# Patient Record
Sex: FEMALE | Race: WHITE | Employment: OTHER | URBAN - METROPOLITAN AREA
[De-identification: names, ages, dates, MRNs, and addresses within clinical notes are randomized per-mention and may not be internally consistent; named-entity substitution may affect disease eponyms.]

---

## 2017-04-17 ENCOUNTER — OFFICE VISIT (OUTPATIENT)
Dept: FAMILY MEDICINE CLINIC | Facility: CLINIC | Age: 82
End: 2017-04-17

## 2017-04-17 VITALS
HEART RATE: 72 BPM | WEIGHT: 137 LBS | RESPIRATION RATE: 24 BRPM | OXYGEN SATURATION: 95 % | DIASTOLIC BLOOD PRESSURE: 70 MMHG | SYSTOLIC BLOOD PRESSURE: 148 MMHG | TEMPERATURE: 98 F

## 2017-04-17 DIAGNOSIS — J18.9 PNEUMONITIS: Primary | ICD-10-CM

## 2017-04-17 PROCEDURE — 99213 OFFICE O/P EST LOW 20 MIN: CPT

## 2017-04-17 RX ORDER — CIPROFLOXACIN 500 MG/1
500 TABLET, FILM COATED ORAL 2 TIMES DAILY
Qty: 20 TABLET | Refills: 0 | Status: SHIPPED | OUTPATIENT
Start: 2017-04-17 | End: 2017-04-18

## 2017-04-17 RX ORDER — LEVOTHYROXINE SODIUM 0.05 MG/1
TABLET ORAL
COMMUNITY
Start: 2017-03-04

## 2017-04-17 RX ORDER — TRIAMCINOLONE ACETONIDE 0.1 %
PASTE (GRAM) DENTAL
Refills: 2 | COMMUNITY
Start: 2017-01-31

## 2017-04-17 RX ORDER — BENZONATATE 100 MG/1
CAPSULE ORAL
Refills: 0 | COMMUNITY
Start: 2017-04-14

## 2017-04-17 RX ORDER — TRAMADOL HYDROCHLORIDE 50 MG/1
TABLET ORAL
Refills: 0 | COMMUNITY
Start: 2017-02-15

## 2017-04-17 NOTE — PROGRESS NOTES
CHIEF COMPLAINT:   Patient presents with:  Cough/URI: since 4/13/17    HPI:   Donna Rivera is a 80year old female who presents with upper respiratory symptoms for  4  days.  Patient reports cough with yellow colored sputum, cough is keeping pt up at nig CARDIO: RSR without murmur  LYMPH: No lymphadenopathy   EXTREMITIES: no cyanosis or edema         ASSESSMENT AND PLAN:   Ca Omer is a 80year old female who presents with upper respiratory symptoms that are consistent with    ASSESSMENT:   Pneumoni · You may use acetaminophen or ibuprofen to control fever or pain, unless another medicine was prescribed. If you have chronic liver or kidney disease, talk with your health care provider before using these medicines.  Also talk with your provider if [facility] © 1515-4879 The [address] [place] [address], [address] [place]. All rights reserved. This information is not intended as a substitute for professional medical care. Always follow your healthcare professional's instructions.     Encouraged

## 2017-04-18 ENCOUNTER — APPOINTMENT (OUTPATIENT)
Dept: GENERAL RADIOLOGY | Age: 82
End: 2017-04-18
Attending: FAMILY MEDICINE
Payer: MEDICARE

## 2017-04-18 ENCOUNTER — TELEPHONE (OUTPATIENT)
Dept: FAMILY MEDICINE CLINIC | Facility: CLINIC | Age: 82
End: 2017-04-18

## 2017-04-18 ENCOUNTER — HOSPITAL ENCOUNTER (OUTPATIENT)
Age: 82
Discharge: HOME | End: 2017-04-18
Attending: FAMILY MEDICINE
Payer: MEDICARE

## 2017-04-18 VITALS
DIASTOLIC BLOOD PRESSURE: 58 MMHG | TEMPERATURE: 99 F | HEART RATE: 76 BPM | OXYGEN SATURATION: 97 % | RESPIRATION RATE: 22 BRPM | SYSTOLIC BLOOD PRESSURE: 125 MMHG

## 2017-04-18 DIAGNOSIS — J06.9 UPPER RESPIRATORY TRACT INFECTION, UNSPECIFIED TYPE: Primary | ICD-10-CM

## 2017-04-18 PROCEDURE — 99204 OFFICE O/P NEW MOD 45 MIN: CPT

## 2017-04-18 PROCEDURE — 99213 OFFICE O/P EST LOW 20 MIN: CPT

## 2017-04-18 PROCEDURE — 71020 XR CHEST PA + LAT CHEST (CPT=71020): CPT

## 2017-04-18 RX ORDER — AZITHROMYCIN 250 MG/1
TABLET, FILM COATED ORAL
Qty: 1 PACKAGE | Refills: 0 | Status: SHIPPED | OUTPATIENT
Start: 2017-04-18 | End: 2018-09-22

## 2017-04-18 NOTE — PATIENT INSTRUCTIONS
Pneumonia (Adult)  Pneumonia is an infection deep within the lungs. It is in the small air sacs (alveoli). Pneumonia may be caused by a virus or bacteria. Pneumonia caused by bacteria is usually treated with an antibiotic.  Severe cases may need to be andrey If you are 72 or older, you should get a pneumococcal vaccine and a yearly flu (influenza) shot. You should also get these vaccines if you have chronic lung disease like asthma, emphysema, or COPD. Ask your provider about this.   When to seek medical advice

## 2017-04-18 NOTE — ED INITIAL ASSESSMENT (HPI)
Patient came in stating that she was at our walk-in clinic yesterday and was told she had a mild pneumonia. She was started on an antibiotic and was told to get a followup x-ray. She presents today for an x-ray. She is going out of town tomorrow.

## 2017-04-18 NOTE — ED PROVIDER NOTES
Patient Seen in: [address]    History   Patient presents with:  Cough    Stated Complaint: diagnosed with mild pneumonia would like xray     HPI  26-year-old female with a past medical history significant for hypothyroi stated in HPI.     Physical Exam       ED Triage Vitals   BP 04/18/17 1715 150/56 mmHg   Pulse 04/18/17 1715 76   Resp 04/18/17 1715 22   Temp 04/18/17 1715 98.8 °F (37.1 °C)   Temp src --    SpO2 04/18/17 1715 95 %   O2 Device 04/18/17 1715 None (Room air) stay home with close follow-up but the decision is hers to make. She does report she has a follow-up appointment already scheduled with her primary care doctor on Monday. She will be gone from Wednesday to Sunday.   She was counseled that if she develops edema.  CHELSI/MEDIAST:   No visible mass or adenopathy. LUNGS: No focal airspace consolidation. PLEURA: No effusion. No pneumothorax.   BONES: Scoliosis and multilevel degenerative changes of the spine.     Dictated by (CST): Lesly Nino MD on 4/18/2

## 2017-04-18 NOTE — TELEPHONE ENCOUNTER
Follow up call, pt feels tired but overall, not too bad-  \"about the same as yesterday\". Denies fever, shortness of breath, chest discomfort.    Encourage pt to drink plenty of fluids,  Do deep breathing and not to suppress the cough, but rather, cough se

## 2017-06-24 ENCOUNTER — HOSPITAL ENCOUNTER (OUTPATIENT)
Age: 82
Discharge: HOME | End: 2017-06-24
Attending: FAMILY MEDICINE
Payer: MEDICARE

## 2017-06-24 VITALS
HEART RATE: 64 BPM | SYSTOLIC BLOOD PRESSURE: 167 MMHG | BODY MASS INDEX: 22.88 KG/M2 | RESPIRATION RATE: 18 BRPM | HEIGHT: 64 IN | OXYGEN SATURATION: 95 % | TEMPERATURE: 97 F | WEIGHT: 134 LBS | DIASTOLIC BLOOD PRESSURE: 66 MMHG

## 2017-06-24 DIAGNOSIS — H61.23 BILATERAL IMPACTED CERUMEN: Primary | ICD-10-CM

## 2017-06-24 DIAGNOSIS — R03.0 ELEVATED BLOOD PRESSURE READING: ICD-10-CM

## 2017-06-24 PROCEDURE — 99212 OFFICE O/P EST SF 10 MIN: CPT

## 2017-06-24 NOTE — ED PROVIDER NOTES
Patient Seen in: [address]    History   Patient presents with:  Cerumen Impaction    Stated Complaint: ear issue    HPI  Pt is a 79 yo who presents with decreased hearing. She wears Bilateral hearing aids. No fevers. Skin is warm. Psychiatric: She has a normal mood and affect. Her behavior is normal.   Nursing note and vitals reviewed.       Bilateral cerumen removed- bilateral canals are clear    ED Course   Labs Reviewed - No data to display    =====================

## 2017-06-24 NOTE — ED INITIAL ASSESSMENT (HPI)
Patient complains of bilateral cerumen impaction in her ears. She was told in the past to have the wax removed regularly. She used one application of Debrox last night.

## 2017-06-26 ENCOUNTER — HOSPITAL ENCOUNTER (OUTPATIENT)
Age: 82
Discharge: HOME | End: 2017-06-26
Attending: FAMILY MEDICINE
Payer: MEDICARE

## 2017-06-26 VITALS
TEMPERATURE: 98 F | RESPIRATION RATE: 22 BRPM | HEART RATE: 72 BPM | DIASTOLIC BLOOD PRESSURE: 63 MMHG | OXYGEN SATURATION: 97 % | SYSTOLIC BLOOD PRESSURE: 162 MMHG

## 2017-06-26 DIAGNOSIS — H61.21 IMPACTED CERUMEN OF RIGHT EAR: Primary | ICD-10-CM

## 2017-06-26 PROCEDURE — 69210 REMOVE IMPACTED EAR WAX UNI: CPT

## 2017-06-26 PROCEDURE — 99213 OFFICE O/P EST LOW 20 MIN: CPT

## 2017-06-26 NOTE — ED PROVIDER NOTES
Patient Seen in: [address]    History   No chief complaint on file.     Stated Complaint: Ear wax     HPI    66-year-old female who was recently seen for bilateral cerumen impaction presents with 2 days of decreased hear No middle ear effusion. No decreased hearing is noted. Left Ear: Tympanic membrane and external ear normal.   Nose: Mucosal edema present. No rhinorrhea.    Right ear demonstrate serum and impaction   Cardiovascular: Normal rate, regular rhythm, normal he

## 2017-06-26 NOTE — ED NOTES
Right ear irrigated by Nghia Vizcarra MA, large amount of cerumen returned. Ear rechecked by dr. Liz Gloria, ear canal clear of cerumen.

## 2017-06-26 NOTE — ED INITIAL ASSESSMENT (HPI)
Pt here to IC with c/o cant hear out of right ear. Pt was here to IC on Saturday for cerumen removal.  Pt denies any pain, no  Dizziness per pt.

## 2017-06-26 NOTE — ED NOTES
Home instructions given to Pt. Pt verbalizes understanding of home care and follow up care. Gait steady on discharge.

## 2017-07-05 ENCOUNTER — HOSPITAL ENCOUNTER (OUTPATIENT)
Age: 82
Discharge: HOME | End: 2017-07-05
Attending: FAMILY MEDICINE
Payer: MEDICARE

## 2017-07-05 VITALS
TEMPERATURE: 98 F | RESPIRATION RATE: 14 BRPM | OXYGEN SATURATION: 95 % | DIASTOLIC BLOOD PRESSURE: 57 MMHG | HEART RATE: 63 BPM | SYSTOLIC BLOOD PRESSURE: 144 MMHG

## 2017-07-05 DIAGNOSIS — L02.419 CELLULITIS AND ABSCESS OF LOWER EXTREMITY: Primary | ICD-10-CM

## 2017-07-05 DIAGNOSIS — L30.9 DERMATITIS: ICD-10-CM

## 2017-07-05 DIAGNOSIS — L03.119 CELLULITIS AND ABSCESS OF LOWER EXTREMITY: Primary | ICD-10-CM

## 2017-07-05 PROCEDURE — 99213 OFFICE O/P EST LOW 20 MIN: CPT

## 2017-07-05 PROCEDURE — 99214 OFFICE O/P EST MOD 30 MIN: CPT

## 2017-07-05 RX ORDER — CLINDAMYCIN HYDROCHLORIDE 300 MG/1
300 CAPSULE ORAL 3 TIMES DAILY
Qty: 30 CAPSULE | Refills: 0 | Status: SHIPPED | OUTPATIENT
Start: 2017-07-05 | End: 2017-07-15

## 2017-07-05 NOTE — ED PROVIDER NOTES
Patient Seen in: [address]    History   Patient presents with:  Cellulitis (integumentary, infectious)    Stated Complaint: [address]     81 y/o female with history of hypothyroidism presents with several tenderness. Musculoskeletal: Normal range of motion. She exhibits no edema, tenderness or deformity. Skin: Skin is warm and dry. Rash noted. She is not diaphoretic. There is erythema.    Warm with calor, dolor, tumor and rubror suggestive of cellulitis

## 2017-07-05 NOTE — ED INITIAL ASSESSMENT (HPI)
Pt here with complaints of bilateral leg and feet swelling that started over the weekend, pt has some redness noted to her left leg area , pt states she had used a scar cream las week , pt denies any pain or fevers

## 2017-11-01 ENCOUNTER — APPOINTMENT (OUTPATIENT)
Dept: GENERAL RADIOLOGY | Age: 82
End: 2017-11-01
Attending: FAMILY MEDICINE
Payer: MEDICARE

## 2017-11-01 ENCOUNTER — HOSPITAL ENCOUNTER (OUTPATIENT)
Age: 82
Discharge: HOME | End: 2017-11-01
Attending: FAMILY MEDICINE
Payer: MEDICARE

## 2017-11-01 VITALS
RESPIRATION RATE: 18 BRPM | DIASTOLIC BLOOD PRESSURE: 56 MMHG | TEMPERATURE: 97 F | HEART RATE: 65 BPM | SYSTOLIC BLOOD PRESSURE: 144 MMHG | OXYGEN SATURATION: 99 %

## 2017-11-01 DIAGNOSIS — M25.512 ACUTE PAIN OF LEFT SHOULDER: Primary | ICD-10-CM

## 2017-11-01 PROCEDURE — 73030 X-RAY EXAM OF SHOULDER: CPT | Performed by: FAMILY MEDICINE

## 2017-11-01 PROCEDURE — 99213 OFFICE O/P EST LOW 20 MIN: CPT

## 2017-11-01 NOTE — ED PROVIDER NOTES
Patient Seen in: [address]    History   Patient presents with:  Musculoskeletal Problem    Stated Complaint: left arm pain    HPI    HPI: Austyn Triana is a 80year old female who presents with pain over her left uppe Atraumatic  NECK: Supple, full range of motion without pain or paresthesias  EXTREMITIES: Left upper extremity: Pain reproduced with active flexion and extension of upper arm, positive Neer's test, negative crossarm test, negative scapular pushoff test, po

## 2017-11-01 NOTE — ED INITIAL ASSESSMENT (HPI)
Pt is here with left arm pain that developed 2 weeks , pt states she developed pain in the back of her left arm which hurts more with movement , pt states she has taken over the counter meds, accupuncture and massage and nothing is working .  Pt denies any

## 2017-12-14 ENCOUNTER — APPOINTMENT (OUTPATIENT)
Dept: GENERAL RADIOLOGY | Age: 82
End: 2017-12-14
Attending: FAMILY MEDICINE
Payer: MEDICARE

## 2017-12-14 ENCOUNTER — HOSPITAL ENCOUNTER (OUTPATIENT)
Age: 82
Discharge: HOME | End: 2017-12-14
Attending: FAMILY MEDICINE
Payer: MEDICARE

## 2017-12-14 VITALS
TEMPERATURE: 98 F | RESPIRATION RATE: 18 BRPM | SYSTOLIC BLOOD PRESSURE: 151 MMHG | HEART RATE: 75 BPM | HEIGHT: 66 IN | WEIGHT: 134.06 LBS | BODY MASS INDEX: 21.55 KG/M2 | DIASTOLIC BLOOD PRESSURE: 78 MMHG | OXYGEN SATURATION: 99 %

## 2017-12-14 DIAGNOSIS — S22.41XA CLOSED FRACTURE OF MULTIPLE RIBS OF RIGHT SIDE, INITIAL ENCOUNTER: Primary | ICD-10-CM

## 2017-12-14 PROCEDURE — 71101 X-RAY EXAM UNILAT RIBS/CHEST: CPT | Performed by: FAMILY MEDICINE

## 2017-12-14 PROCEDURE — 99214 OFFICE O/P EST MOD 30 MIN: CPT

## 2017-12-14 PROCEDURE — 99213 OFFICE O/P EST LOW 20 MIN: CPT

## 2017-12-14 RX ORDER — TRAMADOL HYDROCHLORIDE 50 MG/1
50 TABLET ORAL EVERY 8 HOURS PRN
Qty: 10 TABLET | Refills: 0 | Status: SHIPPED | OUTPATIENT
Start: 2017-12-14 | End: 2017-12-17

## 2017-12-14 NOTE — ED PROVIDER NOTES
Patient Seen in: [address]    History   Patient presents with:  Fall (musculoskeletal, neurologic)    Stated Complaint: hit back/back pain    HPI  40-year-old female presents with right-sided mid back pain after trippin Pulmonary/Chest: Effort normal and breath sounds normal. No respiratory distress. She has no decreased breath sounds. She has no wheezes. She has no rales. She exhibits no tenderness. Musculoskeletal:        Thoracic back: She exhibits tenderness.  She ex X-ray does show some nondisplaced fractures of the eighth and ninth rib on the right side. Discussed pain control with the patient. She has not tried Tylenol and recommended that this is the first line treatment.   Patient is very concerned about pain con

## 2017-12-14 NOTE — ED NOTES
Pt leaving IC stable no acute distress noted RX given and explained pt verbalizes DC and follow up instructions

## 2017-12-14 NOTE — ED INITIAL ASSESSMENT (HPI)
Per pt fell last night slipped on something slippery bumped back on wall or door knob last night. Pt ambulated slow but steady gait.

## 2018-01-06 ENCOUNTER — HOSPITAL ENCOUNTER (OUTPATIENT)
Age: 83
Discharge: HOME | End: 2018-01-06
Attending: FAMILY MEDICINE
Payer: MEDICARE

## 2018-01-06 VITALS
TEMPERATURE: 97 F | OXYGEN SATURATION: 97 % | WEIGHT: 134 LBS | SYSTOLIC BLOOD PRESSURE: 152 MMHG | HEART RATE: 65 BPM | DIASTOLIC BLOOD PRESSURE: 65 MMHG | HEIGHT: 65 IN | RESPIRATION RATE: 22 BRPM | BODY MASS INDEX: 22.33 KG/M2

## 2018-01-06 DIAGNOSIS — S01.01XA LACERATION OF SCALP, INITIAL ENCOUNTER: Primary | ICD-10-CM

## 2018-01-06 PROCEDURE — 99212 OFFICE O/P EST SF 10 MIN: CPT

## 2018-01-06 NOTE — ED PROVIDER NOTES
Patient Seen in: [address]    History   Patient presents with:  Laceration Abrasion (integumentary)    Stated Complaint: CUT ON HEAD    HPI  80-year-old female well-known to immediate care is brought to [address] [place] [address] by her nurse well-developed and well-nourished. No distress. Patient appears remarkably well for given age. HENT:   Head: Head is with laceration (2cm length, hemostatic with scab formation. Not tender, no ecchymosis or surrounding erythema.  No drainage or fluctuan Epi Medrano MD  [address] [place] [address] [address] [place] [address] [facility]  [phone]    Schedule an appointment as soon as possible for a visit in 2 days  for a wound check        Medications Prescribed:  Current Discharge Medication List

## 2018-01-06 NOTE — ED INITIAL ASSESSMENT (HPI)
Per pt reports hit head on metal bar while taking out garbage denies any LOC denies NV reports happened about 2 hours ago. Area is cleansed was irrigated by patient and neighbor. Pt denies any complaints bruising noted.

## 2018-04-22 ENCOUNTER — HOSPITAL ENCOUNTER (OUTPATIENT)
Age: 83
Discharge: HOME | End: 2018-04-22
Attending: EMERGENCY MEDICINE
Payer: MEDICARE

## 2018-04-22 ENCOUNTER — APPOINTMENT (OUTPATIENT)
Dept: GENERAL RADIOLOGY | Age: 83
End: 2018-04-22
Attending: EMERGENCY MEDICINE
Payer: MEDICARE

## 2018-04-22 VITALS
TEMPERATURE: 98 F | SYSTOLIC BLOOD PRESSURE: 152 MMHG | OXYGEN SATURATION: 96 % | HEART RATE: 63 BPM | RESPIRATION RATE: 18 BRPM | DIASTOLIC BLOOD PRESSURE: 68 MMHG

## 2018-04-22 DIAGNOSIS — S46.112A STRAIN OF MUSCLE, FASCIA AND TENDON OF LONG HEAD OF BICEPS, LEFT ARM, INITIAL ENCOUNTER: Primary | ICD-10-CM

## 2018-04-22 PROCEDURE — 93010 ELECTROCARDIOGRAM REPORT: CPT

## 2018-04-22 PROCEDURE — 99214 OFFICE O/P EST MOD 30 MIN: CPT

## 2018-04-22 PROCEDURE — 73060 X-RAY EXAM OF HUMERUS: CPT | Performed by: EMERGENCY MEDICINE

## 2018-04-22 PROCEDURE — 93005 ELECTROCARDIOGRAM TRACING: CPT

## 2018-04-22 PROCEDURE — 93010 ELECTROCARDIOGRAM REPORT: CPT | Performed by: EMERGENCY MEDICINE

## 2018-04-22 RX ORDER — IBUPROFEN 400 MG/1
400 TABLET ORAL EVERY 8 HOURS PRN
Qty: 30 TABLET | Refills: 0 | Status: SHIPPED | OUTPATIENT
Start: 2018-04-22 | End: 2018-04-29

## 2018-04-22 NOTE — ED PROVIDER NOTES
Patient Seen in: [address]    History   Patient presents with:  Arm Pain    Stated Complaint: left arm pain    HPI    The patient is a 80-year-old female with past history of thyroid disease who presents now with the ab Patient is awake, alert and oriented ×3. The patient's motor strength is 5 out of 5 and symmetric in the upper and lower extremities bilaterally  Extremities: There is mild reproducible focal tenderness of the left bicep area.   Is normal range of motion o

## 2018-04-22 NOTE — ED INITIAL ASSESSMENT (HPI)
Pt here with complaints of left arm pain that has been going on for about a week, pt states the pain is behind her left upper arm, pt states it hurts more with movement, pt denies chest pain, sob or numbness to her left arm

## 2018-04-22 NOTE — ED NOTES
Pt discharged home, perscription electronically sent to the pharmacy, pt instructed to follow up with her primary md if symptoms do not improve

## 2018-09-11 ENCOUNTER — HOSPITAL ENCOUNTER (OUTPATIENT)
Age: 83
Discharge: HOME | End: 2018-09-11
Attending: FAMILY MEDICINE
Payer: MEDICARE

## 2018-09-11 ENCOUNTER — APPOINTMENT (OUTPATIENT)
Dept: GENERAL RADIOLOGY | Age: 83
End: 2018-09-11
Attending: FAMILY MEDICINE
Payer: MEDICARE

## 2018-09-11 VITALS
RESPIRATION RATE: 18 BRPM | SYSTOLIC BLOOD PRESSURE: 138 MMHG | TEMPERATURE: 99 F | OXYGEN SATURATION: 97 % | HEART RATE: 61 BPM | DIASTOLIC BLOOD PRESSURE: 51 MMHG

## 2018-09-11 DIAGNOSIS — S00.91XA ABRASION OF HEAD, INITIAL ENCOUNTER: Primary | ICD-10-CM

## 2018-09-11 DIAGNOSIS — R10.12 ABDOMINAL PAIN, LEFT UPPER QUADRANT: ICD-10-CM

## 2018-09-11 DIAGNOSIS — S20.212A CONTUSION OF RIB ON LEFT SIDE, INITIAL ENCOUNTER: ICD-10-CM

## 2018-09-11 LAB
#LYMPHOCYTE IC: 1.6 X10ˆ3/UL (ref 0.9–3.2)
#MXD IC: 0.4 X10ˆ3/UL (ref 0.1–1)
#NEUTROPHIL IC: 4.1 X10ˆ3/UL (ref 1.3–6.7)
CREAT SERPL-MCNC: 0.8 MG/DL (ref 0.55–1.02)
GLUCOSE BLD-MCNC: 96 MG/DL (ref 70–99)
HCT IC: 38.7 % (ref 37–54)
HGB IC: 12.7 G/DL (ref 11.7–16)
ISTAT BUN: 20 MG/DL (ref 8–20)
ISTAT CHLORIDE: 103 MMOL/L (ref 101–111)
ISTAT HEMATOCRIT: 37 % (ref 34–50)
ISTAT IONIZED CALCIUM: 1.19 MMOL/L
ISTAT POTASSIUM: 4.5 MMOL/L (ref 3.6–5.1)
ISTAT SODIUM: 139 MMOL/L (ref 136–144)
ISTAT TCO2: 27 MMOL/L (ref 22–32)
LYMPHOCYTES NFR BLD AUTO: 25.5 %
MCH IC: 30.3 PG (ref 27–33.2)
MCHC IC: 32.8 G/DL (ref 31–37)
MCV IC: 92.4 FL (ref 81–100)
MIXED CELL %: 6.4 %
NEUTROPHILS NFR BLD AUTO: 68.1 %
PLT IC: 186 X10ˆ3/UL (ref 150–450)
RBC IC: 4.19 X10ˆ6/UL (ref 3.8–5.1)
WBC IC: 6.1 X10ˆ3/UL (ref 4–13)

## 2018-09-11 PROCEDURE — 85025 COMPLETE CBC W/AUTO DIFF WBC: CPT | Performed by: FAMILY MEDICINE

## 2018-09-11 PROCEDURE — 80047 BASIC METABLC PNL IONIZED CA: CPT

## 2018-09-11 PROCEDURE — 99214 OFFICE O/P EST MOD 30 MIN: CPT

## 2018-09-11 PROCEDURE — 71101 X-RAY EXAM UNILAT RIBS/CHEST: CPT | Performed by: FAMILY MEDICINE

## 2018-09-11 PROCEDURE — 70250 X-RAY EXAM OF SKULL: CPT | Performed by: FAMILY MEDICINE

## 2018-09-11 PROCEDURE — 36415 COLL VENOUS BLD VENIPUNCTURE: CPT

## 2018-09-11 NOTE — ED INITIAL ASSESSMENT (HPI)
Pt here with complaints of a fall that occurred last week , pt states she fell while getting out of bed and hit here head on the night stand and noticed blood, pt states she also started developing left rib pain but does not recall injuring her left side d

## 2018-09-11 NOTE — ED PROVIDER NOTES
Patient Seen in: [address]    History   Patient presents with:  Fall (musculoskeletal, neurologic)    Stated Complaint: took a fall, pain by rib cage    HPI  80year-old female well-known to immediate care presents to I distress. HENT:   Head: Head is with abrasion and with laceration (0.5cm scabbed abrasion over frontal lobe, center of head. Not fluctuant. Mildly tender. No surrounding erythema. ).  Head is without raccoon's eyes, without right periorbital erythema and involving the posterior margins of the right eighth,  9th and 10th ribs. SOFT TISSUES:            Mild cardiomegaly. Tortuous atherosclerotic aorta. LUNGS/ PLEURA[de-identified]         Prominent bibasilar markings. No acute pulmonary disease.   OTHER:             Mod and recommend the patient goes to the ER but she adamantly refuses. CBC, BMP and urine dip done in clinic. CBC and BMP were all normal. Urine was not caught in the collection hat/cup.   Again patient refuses to go the ER and is coherent and capable of [facility]

## 2018-09-22 ENCOUNTER — HOSPITAL ENCOUNTER (OUTPATIENT)
Age: 83
Discharge: HOME | End: 2018-09-22
Attending: EMERGENCY MEDICINE
Payer: MEDICARE

## 2018-09-22 VITALS
RESPIRATION RATE: 20 BRPM | TEMPERATURE: 97 F | HEART RATE: 57 BPM | SYSTOLIC BLOOD PRESSURE: 107 MMHG | DIASTOLIC BLOOD PRESSURE: 52 MMHG | OXYGEN SATURATION: 100 %

## 2018-09-22 DIAGNOSIS — S91.115A LACERATION OF THIRD TOE OF LEFT FOOT, INITIAL ENCOUNTER: Primary | ICD-10-CM

## 2018-09-22 PROCEDURE — 99213 OFFICE O/P EST LOW 20 MIN: CPT

## 2018-09-22 PROCEDURE — 90471 IMMUNIZATION ADMIN: CPT

## 2018-09-22 NOTE — ED NOTES
Pt dicharged home, dressing applied to 3rd left toe, pt instructed to follow up with her podiatrist on Monday for further follow up

## 2018-09-22 NOTE — ED PROVIDER NOTES
Patient Seen in: [address]    History   Patient presents with:  Laceration Abrasion (integumentary)    Stated Complaint: Left Toe Injury     HPI    The patient is a 63-year-old female who was attempting to cut a painful and hemostasis was noted prior to discharge      MDM   Impression: Multiple toe lacerations with tissue avulsion. The bleeding has been controlled with Surgicel.   We will have the patient elevate her foot and to see her podiatrist in 2 days to evaluate th

## 2018-09-22 NOTE — ED INITIAL ASSESSMENT (HPI)
Pt here with complaints of 3rd toe left foot injury, pt states she was clipping her toenail this morning and nip a part of her skin off and was bleeding bad and would not stop, small cut noted to 3rd left toe , pt denies any other injury, pt has no hx of d

## 2019-04-11 ENCOUNTER — APPOINTMENT (OUTPATIENT)
Dept: GENERAL RADIOLOGY | Age: 84
End: 2019-04-11
Attending: FAMILY MEDICINE
Payer: MEDICARE

## 2019-04-11 ENCOUNTER — HOSPITAL ENCOUNTER (OUTPATIENT)
Age: 84
Discharge: HOME | End: 2019-04-11
Attending: FAMILY MEDICINE
Payer: MEDICARE

## 2019-04-11 VITALS
TEMPERATURE: 97 F | BODY MASS INDEX: 23 KG/M2 | SYSTOLIC BLOOD PRESSURE: 163 MMHG | WEIGHT: 138 LBS | RESPIRATION RATE: 17 BRPM | HEART RATE: 57 BPM | OXYGEN SATURATION: 98 % | DIASTOLIC BLOOD PRESSURE: 61 MMHG

## 2019-04-11 DIAGNOSIS — M54.9 MID BACK PAIN: Primary | ICD-10-CM

## 2019-04-11 PROCEDURE — 99214 OFFICE O/P EST MOD 30 MIN: CPT

## 2019-04-11 PROCEDURE — 93010 ELECTROCARDIOGRAM REPORT: CPT | Performed by: FAMILY MEDICINE

## 2019-04-11 PROCEDURE — 72072 X-RAY EXAM THORAC SPINE 3VWS: CPT | Performed by: FAMILY MEDICINE

## 2019-04-11 PROCEDURE — 93005 ELECTROCARDIOGRAM TRACING: CPT

## 2019-04-11 PROCEDURE — 72100 X-RAY EXAM L-S SPINE 2/3 VWS: CPT | Performed by: FAMILY MEDICINE

## 2019-04-11 PROCEDURE — 93010 ELECTROCARDIOGRAM REPORT: CPT

## 2019-04-11 NOTE — ED PROVIDER NOTES
Patient Seen in: [address]    History   Patient presents with:  Back Pain (musculoskeletal)    Stated Complaint: Back Pain    HPI  79yo F well known to IC is brought in today by a friend over concern for right mid back Normal rate and regular rhythm. Pulmonary/Chest: Effort normal and breath sounds normal.   Musculoskeletal:        Thoracic back: She exhibits tenderness (right parathoracic region, reproducible to touch).  She exhibits normal range of motion, no bony ten space narrowing at L1-L2 and L5-S1 and less severe narrowing at L2-L3, L3-L4 and L4-L5.  Moderate bony proliferative changes anteriorly at T12-L1, mild at L1-L2, L2-L3, L3-L4, L4-L5 levels.  Vacuum phenomena at   L5-S1.   SACROILIAC JOINTS: Normal.    OTHER going to the ER for further evaluation including a CT scan for confirmation that there are not any compression fractures but patient declines. She reports her pain is better today than it was yesterday. Declines Tylenol in clinic.   She would like to go h

## 2019-05-06 ENCOUNTER — HOSPITAL ENCOUNTER (OUTPATIENT)
Age: 84
Discharge: OTHER INSTITUTION | End: 2019-05-06
Attending: EMERGENCY MEDICINE
Payer: MEDICARE

## 2019-05-06 ENCOUNTER — APPOINTMENT (OUTPATIENT)
Dept: GENERAL RADIOLOGY | Age: 84
End: 2019-05-06
Attending: EMERGENCY MEDICINE
Payer: MEDICARE

## 2019-05-06 VITALS
RESPIRATION RATE: 18 BRPM | HEART RATE: 56 BPM | DIASTOLIC BLOOD PRESSURE: 70 MMHG | SYSTOLIC BLOOD PRESSURE: 165 MMHG | OXYGEN SATURATION: 100 % | TEMPERATURE: 98 F

## 2019-05-06 DIAGNOSIS — S32.591A CLOSED FRACTURE OF RAMUS OF RIGHT PUBIS, INITIAL ENCOUNTER (HCC): Primary | ICD-10-CM

## 2019-05-06 PROCEDURE — 99213 OFFICE O/P EST LOW 20 MIN: CPT

## 2019-05-06 PROCEDURE — 73502 X-RAY EXAM HIP UNI 2-3 VIEWS: CPT | Performed by: EMERGENCY MEDICINE

## 2019-05-07 NOTE — ED INITIAL ASSESSMENT (HPI)
Pt here with family friend, pt states that she had a fall last Monday but does not fully remember the fall, pt noted a large bruise on her right hip, pt has been ambulating well until today when pt states the pain has gotten worse and hurts to walk now

## 2019-05-07 NOTE — ED NOTES
Pt instructed by md to go to the emergency room for further evaluation on her broken pelvis, pt states she will be going to [facility] via car

## 2019-05-07 NOTE — ED PROVIDER NOTES
Patient Seen in: [address]    History   Patient presents with:  Fall (musculoskeletal, neurologic)    Stated Complaint: bruised RT hip from falling    HPI    Patient is a 59-year-old female with a history of multiple fa sounds, nontender nondistended  Back: No tenderness over the posterior pelvis or the spine    ED Course   Labs Reviewed - No data to display       X-ray demonstrate pubic ramus fracture      MDM   To the emergency department at [address] [place] [address]

## 2020-01-06 NOTE — ED NOTES
Pt leaving IC stable no acute distress noted.  Pt verbalizes DC and follow up instructions
Detail Level: Detailed

## (undated) NOTE — ED AVS SNAPSHOT
[facility]    [id number] [place] [id number]    Phone:  [phone]   MRN: [id number]    Department:  [facility]   Date of Visit:  4/18/2017 Discharge References/Attachments     VIRAL SYNDROME (ADULT) (ENGLISH)      Disclosure     Insurance plans vary and the physician(s) referred by the Immediate Care may not be covered by your plan.   It is possible that the physician may not participate in yo IF THERE IS ANY CHANGE OR WORSENING OF YOUR CONDITION, CALL YOUR PRIMARY CARE PHYSICIAN AT ONCE OR GO TO THE EMERGENCY DEPARTMENT.     If you have been prescribed any medication(s), please fill your prescription right away and begin taking the medication(s) you to explore options for quitting.     - If you have concerns related to behavioral health issues or thoughts of harming yourself, contact [facility] at [phone].     - If you don’t have insurance, Keysha Benson

## (undated) NOTE — MR AVS SNAPSHOT
[address]               Thank you for choosing us for your health care visit with AGATHA Bernstein.   We are glad to serve you and happy to provide you with this summary of your visi a fever. It may cause severe liver damage. · Your appetite may be poor, so a light diet is fine. · Drink 6 to 8 glasses of fluids every day to make sure you are getting enough fluids.  Beverages can include water, sport drinks, sodas without caffeine, jui they could do an x-ray, but we discussed treating it and you will follow up closely with your physician within 2 days before your trip. Follow up with your physician for a recheck in 2 days if not improving or go to Emergency Dept if worsen.   Follow up wi If you have questions, you can call [phone] to talk to our [facility] Staff. Remember, MyChart is NOT to be used for urgent needs. For medical emergencies, dial 911.         Educational Information     Your blood pressure indicates you may be Tips for increasing your physical activity – Adults who are physically active are less likely to develop some chronic diseases than adults who are inactive.      HOW TO GET STARTED: HOW TO STAY MOTIVATED:   Start activities slowly and build up over time Do